# Patient Record
Sex: FEMALE | URBAN - METROPOLITAN AREA
[De-identification: names, ages, dates, MRNs, and addresses within clinical notes are randomized per-mention and may not be internally consistent; named-entity substitution may affect disease eponyms.]

---

## 2019-04-09 NOTE — PATIENT DISCUSSION
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 TREAT MGD FIRST THEN CONSIDER OTHER TREATMENT OPTIONS IF SYMPTOMS PERSIST.

## 2019-04-09 NOTE — PATIENT DISCUSSION
ANGIE/K SICCA, OU- PRESCRIBED ARTIFICIAL TEARS BID - QID, OU AND THE DAILY INTAKE OF OMEGA 3/FATTY ACIDS. CONSIDER OTHER TREATMENT OPTIONS IF SYMPTOMS PERSIST/WORSEN. FOLLOW.

## 2020-08-07 ENCOUNTER — IMPORTED ENCOUNTER (OUTPATIENT)
Dept: URBAN - METROPOLITAN AREA CLINIC 9 | Facility: CLINIC | Age: 75
End: 2020-08-07

## 2020-10-21 ENCOUNTER — IMPORTED ENCOUNTER (OUTPATIENT)
Dept: URBAN - METROPOLITAN AREA CLINIC 9 | Facility: CLINIC | Age: 75
End: 2020-10-21

## 2020-11-19 ENCOUNTER — IMPORTED ENCOUNTER (OUTPATIENT)
Dept: URBAN - METROPOLITAN AREA CLINIC 9 | Facility: CLINIC | Age: 75
End: 2020-11-19

## 2020-12-09 ENCOUNTER — IMPORTED ENCOUNTER (OUTPATIENT)
Dept: URBAN - METROPOLITAN AREA CLINIC 9 | Facility: CLINIC | Age: 75
End: 2020-12-09

## 2020-12-17 ENCOUNTER — IMPORTED ENCOUNTER (OUTPATIENT)
Dept: URBAN - METROPOLITAN AREA CLINIC 9 | Facility: CLINIC | Age: 75
End: 2020-12-17

## 2020-12-30 ENCOUNTER — IMPORTED ENCOUNTER (OUTPATIENT)
Dept: URBAN - METROPOLITAN AREA CLINIC 9 | Facility: CLINIC | Age: 75
End: 2020-12-30

## 2021-01-12 ENCOUNTER — IMPORTED ENCOUNTER (OUTPATIENT)
Dept: URBAN - METROPOLITAN AREA CLINIC 9 | Facility: CLINIC | Age: 76
End: 2021-01-12

## 2021-10-18 ASSESSMENT — VISUAL ACUITY
OS_CC: 20/25 -2 SN
OS_CC: 20/30 - SN
OD_CC: 20/20 SN
OS_CC: 20/25 - SN
OS_SC: 20/80 - SN
OS_CC: 20/25 SN
OD_SC: 20/400 SN
OD_CC: 20/25 SN
OD_CC: 20/20 SN
OS_SC: 20/40 + SN
OD_CC: 20/30 -2 SN
OS_CC: 20/20 -2 SN
OD_SC: 20/25 -2 SN
OD_CC: 20/30 -2 SN
OD_SC: 20/30 - SN

## 2021-10-18 ASSESSMENT — TONOMETRY
OS_IOP_MMHG: 14
OD_IOP_MMHG: 12
OD_IOP_MMHG: 14
OS_IOP_MMHG: 17
OS_IOP_MMHG: 10
OS_IOP_MMHG: 14
OD_IOP_MMHG: 16
OD_IOP_MMHG: 17

## 2021-10-18 ASSESSMENT — KERATOMETRY
OS_AXISANGLE2_DEGREES: 80
OD_K2POWER_DIOPTERS: 45.5
OS_AXISANGLE_DEGREES: 170
OD_AXISANGLE2_DEGREES: 99
OS_K1POWER_DIOPTERS: 45
OD_AXISANGLE_DEGREES: 9
OD_K1POWER_DIOPTERS: 44.5
OS_K2POWER_DIOPTERS: 46.5

## 2022-07-06 RX ORDER — LEVOTHYROXINE SODIUM 88 UG/1
TABLET ORAL
COMMUNITY

## 2022-07-06 RX ORDER — MONTELUKAST SODIUM 10 MG/1
TABLET ORAL
COMMUNITY

## 2022-07-06 RX ORDER — LANSOPRAZOLE 15 MG/1
CAPSULE, DELAYED RELEASE ORAL
COMMUNITY

## 2022-07-06 RX ORDER — LOTEPREDNOL ETABONATE 5 MG/G
OINTMENT OPHTHALMIC
COMMUNITY
Start: 2021-08-16

## 2022-07-06 RX ORDER — IBUPROFEN 200 MG
CAPSULE ORAL
COMMUNITY

## 2022-07-06 RX ORDER — ASPIRIN 81 MG/1
TABLET, CHEWABLE ORAL
COMMUNITY

## 2022-07-06 RX ORDER — LORATADINE 10 MG/1
TABLET ORAL
COMMUNITY

## 2022-07-06 RX ORDER — DILTIAZEM HYDROCHLORIDE 240 MG/1
1 CAPSULE, EXTENDED RELEASE ORAL
COMMUNITY

## 2022-07-06 RX ORDER — LORAZEPAM 0.5 MG/1
TABLET ORAL
COMMUNITY

## 2022-07-06 RX ORDER — HYDROCODONE BITARTRATE AND ACETAMINOPHEN 10; 325 MG/1; MG/1
TABLET ORAL
COMMUNITY

## 2023-09-06 ENCOUNTER — ESTABLISHED PATIENT (OUTPATIENT)
Dept: URBAN - METROPOLITAN AREA CLINIC 4 | Facility: CLINIC | Age: 78
End: 2023-09-06

## 2023-09-06 DIAGNOSIS — E11.9: ICD-10-CM

## 2023-09-06 DIAGNOSIS — H01.024: ICD-10-CM

## 2023-09-06 DIAGNOSIS — H10.45: ICD-10-CM

## 2023-09-06 DIAGNOSIS — H01.021: ICD-10-CM

## 2023-09-06 DIAGNOSIS — H11.153: ICD-10-CM

## 2023-09-06 PROCEDURE — 92015 DETERMINE REFRACTIVE STATE: CPT

## 2023-09-06 PROCEDURE — 92014 COMPRE OPH EXAM EST PT 1/>: CPT

## 2023-09-06 ASSESSMENT — KERATOMETRY
OS_K2POWER_DIOPTERS: 45.75
OD_AXISANGLE_DEGREES: 009
OD_K2POWER_DIOPTERS: 45.50
OD_K1POWER_DIOPTERS: 44.75
OS_AXISANGLE_DEGREES: 174
OS_AXISANGLE2_DEGREES: 84
OD_AXISANGLE2_DEGREES: 99
OS_K1POWER_DIOPTERS: 44.75

## 2023-09-06 ASSESSMENT — VISUAL ACUITY
OS_GLARE: 20/40
OD_CC: 20/20-1
OU_CC: 20/20
OU_SC: 20/25+1
OS_CC: 20/20-1
OD_SC: 20/25-2
OD_GLARE: 20/40
OS_SC: 20/25-1

## 2023-09-06 ASSESSMENT — TONOMETRY
OS_IOP_MMHG: 14
OD_IOP_MMHG: 14

## 2024-09-12 NOTE — PATIENT DISCUSSION
Dry Eye Syndrome (ANGIE) Counseling: Dry Eye Syndrome, also known as Keratoconjunctivitis Sicca, is a common condition that occurs when your tears are not able to provide adequate lubrication for your eyes. Symptoms can be exacerbated by environmental factors such as smoke, wind, or prolonged eye use. Treatment options include, but are not limited to, artificial tears, punctal plugs, Restasis, oral omega-3 supplements, and lubricating ointments. I have explained to the patient that successful management is dependent on patient compliance with treatment as prescribed and/or the treatment regimen. [facility]    Phone Message    May a detailed message be left on voicemail: yes     Reason for Call: Medication Refill Request    Has the patient contacted the pharmacy for the refill? Yes   Name of medication being requested: oxyCODONE (ROXICODONE) 5 MG tablet   Provider who prescribed the medication:     Michael Dozier MD    [facility]UR[facility]   Pharmacy:   [address]     Date medication is needed: 9/12/2024 per patient    Patient states she was told by her surgeon to call the Pain clinic to request the medication moving forward. Please review and call patient to discuss.    Action Taken: Message routed to:  Other: [facility]    Travel Screening: Not Applicable     Date of Service:

## 2025-05-07 ENCOUNTER — COMPREHENSIVE EXAM (OUTPATIENT)
Age: 80
End: 2025-05-07

## 2025-05-07 DIAGNOSIS — H26.493: ICD-10-CM

## 2025-05-07 DIAGNOSIS — H10.45: ICD-10-CM

## 2025-05-07 DIAGNOSIS — H01.021: ICD-10-CM

## 2025-05-07 DIAGNOSIS — E11.9: ICD-10-CM

## 2025-05-07 DIAGNOSIS — H11.153: ICD-10-CM

## 2025-05-07 DIAGNOSIS — H01.024: ICD-10-CM

## 2025-05-07 PROCEDURE — 92014 COMPRE OPH EXAM EST PT 1/>: CPT

## 2025-05-07 PROCEDURE — 92015 DETERMINE REFRACTIVE STATE: CPT
